# Patient Record
Sex: FEMALE | Race: WHITE | NOT HISPANIC OR LATINO | Employment: UNEMPLOYED | URBAN - METROPOLITAN AREA
[De-identification: names, ages, dates, MRNs, and addresses within clinical notes are randomized per-mention and may not be internally consistent; named-entity substitution may affect disease eponyms.]

---

## 2017-02-21 ENCOUNTER — TELEPHONE (OUTPATIENT)
Dept: OTOLARYNGOLOGY | Facility: CLINIC | Age: 32
End: 2017-02-21

## 2017-02-21 DIAGNOSIS — H81.10 BPPV (BENIGN PAROXYSMAL POSITIONAL VERTIGO), UNSPECIFIED LATERALITY: Primary | ICD-10-CM

## 2017-02-21 NOTE — TELEPHONE ENCOUNTER
----- Message from Karla Cherry PT sent at 2/20/2017 12:51 PM CST -----  Regarding: VR orders  HI! I saw her last year for BPPV and migraine related vertigo/dizziness. It helped. She has a long history of issues. She wants to be seen again. I need a new order for Vestibular rehab. Can you please put in another order for vertigo. Thanks! shelton

## 2017-02-23 ENCOUNTER — HOSPITAL ENCOUNTER (OUTPATIENT)
Dept: PHYSICAL THERAPY | Facility: CLINIC | Age: 32
Setting detail: THERAPIES SERIES
End: 2017-02-23
Attending: OTOLARYNGOLOGY
Payer: COMMERCIAL

## 2017-02-23 PROCEDURE — 95992 CANALITH REPOSITIONING PROC: CPT | Mod: GP | Performed by: PHYSICAL THERAPIST

## 2017-02-23 PROCEDURE — 97161 PT EVAL LOW COMPLEX 20 MIN: CPT | Mod: GP | Performed by: PHYSICAL THERAPIST

## 2017-02-23 PROCEDURE — 40000840 ZZHC STATISTIC PT VESTIBULAR VISIT: Performed by: PHYSICAL THERAPIST
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
## 2017-02-24 ENCOUNTER — TELEPHONE (OUTPATIENT)
Dept: PHYSICAL THERAPY | Facility: CLINIC | Age: 32
End: 2017-02-24

## 2017-02-24 NOTE — TELEPHONE ENCOUNTER
I called Sarah this afternoon to follow up on yesterday's Vestibular rehab appt. When she left yesterday she was feeling quite ill after the testing and treatment. She reports that she was nauseated most of the afternoon, until about 6. She woke up today feeling OK but any sort of motion brings on symptoms. She cannot walk fast or move fast. I asked her about sneezing, coughing and bearing down. She thinks they may make her dizziness worse. She does not recall any chewing of food making her feel worse. She spoke to her neurologist office and they increased her migraine meds and if she is not feeling better next week come in and they will do an IV medication. I told her to check in with me next week also. I can try treatment on right side (I tried one time on left side 2/23) and do some testing to see if symptoms recreated with bearing down, increased intercranial pressure. Sweetie Cherry DPT, MPT, NCS

## 2017-02-27 ENCOUNTER — HOSPITAL ENCOUNTER (OUTPATIENT)
Dept: PHYSICAL THERAPY | Facility: CLINIC | Age: 32
Setting detail: THERAPIES SERIES
End: 2017-02-27
Attending: OTOLARYNGOLOGY
Payer: COMMERCIAL

## 2017-02-27 PROCEDURE — 40000840 ZZHC STATISTIC PT VESTIBULAR VISIT: Performed by: PHYSICAL THERAPIST

## 2017-02-27 PROCEDURE — 97112 NEUROMUSCULAR REEDUCATION: CPT | Mod: GP | Performed by: PHYSICAL THERAPIST

## 2018-09-13 ENCOUNTER — HOSPITAL ENCOUNTER (OUTPATIENT)
Dept: PHYSICAL THERAPY | Facility: CLINIC | Age: 33
Setting detail: THERAPIES SERIES
End: 2018-09-13
Attending: FAMILY MEDICINE
Payer: COMMERCIAL

## 2018-09-13 PROCEDURE — 40000840 ZZHC STATISTIC PT VESTIBULAR VISIT: Performed by: PHYSICAL THERAPIST

## 2018-09-13 PROCEDURE — 95992 CANALITH REPOSITIONING PROC: CPT | Mod: GP | Performed by: PHYSICAL THERAPIST

## 2018-09-13 PROCEDURE — 97162 PT EVAL MOD COMPLEX 30 MIN: CPT | Mod: GP | Performed by: PHYSICAL THERAPIST
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
## 2018-09-20 ENCOUNTER — HOSPITAL ENCOUNTER (OUTPATIENT)
Dept: PHYSICAL THERAPY | Facility: CLINIC | Age: 33
Setting detail: THERAPIES SERIES
End: 2018-09-20
Attending: FAMILY MEDICINE
Payer: COMMERCIAL

## 2018-09-20 PROCEDURE — 95992 CANALITH REPOSITIONING PROC: CPT | Mod: GP | Performed by: PHYSICAL THERAPIST

## 2018-09-20 PROCEDURE — 40000840 ZZHC STATISTIC PT VESTIBULAR VISIT: Performed by: PHYSICAL THERAPIST

## 2018-09-20 PROCEDURE — 97112 NEUROMUSCULAR REEDUCATION: CPT | Mod: GP | Performed by: PHYSICAL THERAPIST

## 2018-10-02 ENCOUNTER — TELEPHONE (OUTPATIENT)
Dept: PHYSICAL THERAPY | Facility: CLINIC | Age: 33
End: 2018-10-02

## 2018-10-02 NOTE — TELEPHONE ENCOUNTER
I called Sarah to follow up on last appt and her vertigo. She had to reschedule her appt with DR Vallejo to 10/16. She will also have a hearing test prior to seeing him. I gave her the name and number to neurology clinic at [facility] to possible see DR Stevens about her migraine headaches. She did check with her insurance and she is covered to go to [place] but has not called to check into an appt there.     She felt pretty miserable/sick after my last appt for a couple of days. She is feeling a little better now and has not had to take the lorazepam for about 1.5 weeks now to help her sleep.     I will wait until after she sees Dr Vallejo to see what the next step is.     Sweetie Cherry DPT, MPT, NCS   Physical Therapist   Board Certified Neurologic Clinical Specialist   [facility]   [address] [place] [address]   ENT/Audiology Clinic-4th [place] [address]   [email or web address]  [email or web address]   [phone]   Clinic: /   Fax: [phone] or

## 2018-10-16 ENCOUNTER — OFFICE VISIT (OUTPATIENT)
Dept: AUDIOLOGY | Facility: CLINIC | Age: 33
End: 2018-10-16
Payer: COMMERCIAL

## 2018-10-16 ENCOUNTER — OFFICE VISIT (OUTPATIENT)
Dept: OTOLARYNGOLOGY | Facility: CLINIC | Age: 33
End: 2018-10-16
Payer: COMMERCIAL

## 2018-10-16 VITALS
HEART RATE: 87 BPM | DIASTOLIC BLOOD PRESSURE: 89 MMHG | HEIGHT: 62 IN | SYSTOLIC BLOOD PRESSURE: 140 MMHG | BODY MASS INDEX: 23.92 KG/M2 | WEIGHT: 130 LBS

## 2018-10-16 DIAGNOSIS — R42 DIZZINESS: Primary | ICD-10-CM

## 2018-10-16 DIAGNOSIS — H81.319 VERTIGO, AURAL, UNSPECIFIED LATERALITY: Primary | ICD-10-CM

## 2018-10-16 DIAGNOSIS — G43.109 MIGRAINE WITH VERTIGO: ICD-10-CM

## 2018-10-16 PROCEDURE — 92550 TYMPANOMETRY & REFLEX THRESH: CPT | Performed by: AUDIOLOGIST

## 2018-10-16 PROCEDURE — 92557 COMPREHENSIVE HEARING TEST: CPT | Performed by: AUDIOLOGIST

## 2018-10-16 PROCEDURE — 99213 OFFICE O/P EST LOW 20 MIN: CPT | Performed by: OTOLARYNGOLOGY

## 2018-10-16 RX ORDER — TURMERIC ROOT EXTRACT 500 MG
TABLET ORAL
COMMUNITY

## 2018-10-16 NOTE — NURSING NOTE
"Sarah Carrillo's goals for this visit include:   Chief Complaint   Patient presents with     RECHECK     Vertigo       She requests these members of her care team be copied on today's visit information: yes      PCP: Elvira Tee    Referring Provider:  No referring provider defined for this encounter.    /89  Pulse 87  Ht 1.575 m (5' 2\")  Wt 59 kg (130 lb)  BMI 23.78 kg/m2    Maye Vazquez CMA ([facility])    "
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
## 2018-10-16 NOTE — PROGRESS NOTES
AUDIOLOGY REPORT    SUMMARY: Audiology visit completed. See audiogram for results.    RECOMMENDATIONS: Follow-up with ENT.    Daniel Major  Doctor of Audiology  [place] License # [id number]

## 2018-10-16 NOTE — MR AVS SNAPSHOT
After Visit Summary   10/16/2018    Sarah Carrillo    MRN: [id number]           Patient Information     Date Of Birth          1985        Visit Information        Provider Department      10/16/2018 8:30 AM Avis Vallejo MD [facility]        Today's Diagnoses     Vertigo, aural, unspecified laterality    -  1    Migraine with vertigo           Follow-ups after your visit        Additional Services     AUDIOLOGY BALANCE REFERRAL       Your provider has referred you to: [facility] Balance [facility] [phone] [email or web address]    Audiology Balance Referral (Comprehensive) includes Videonystagmography (VNG), Rotational Chair testing, and Computerized Dynamic Posturography.  You may also select individual tests from the list below.    Procedure(s): Audiology Balance Referral Comprehensive (includes Videonystagmography (VNG), Rotational Chair Testing and Computerized Dynamic Posturography)                  Your next 10 appointments already scheduled     Oct 24, 2018  1:15 PM CDT   MR BRAIN W/O & W CONTRAST with MGMR1   [facility] ([facility]    [id number] [place] [id number] [phone]           How do I prepare for my exam? (Food and drink instructions) **If you will be receiving sedation or general anesthesia, please see special notes below.**  How do I prepare for my exam? (Other instructions) Take your medicines as usual, unless your doctor tells you not to. You may or may not receive intravenous (IV) contrast for this exam pending the discretion of the Radiologist.  You do not need to do anything special to prepare.  **If you will be receiving sedation or general anesthesia, please see special notes below.**  What should I wear: The MRI machine uses a strong magnet. Please wear clothes without metal (snaps, zippers). A sweatsuit works well, or we may give you a  [facility] gown. Please remove any body piercings and hair extensions before you arrive. You will also remove watches, jewelry, hairpins, wallets, dentures, partial dental plates and hearing aids. You may wear contact lenses, and you may be able to wear your rings. We have a safe place to keep your personal items, but it is safer to leave them at home.  How long does the exam take: Most tests take 30 to 60 minutes.  HOWEVER, IF YOUR DOCTOR PRESCRIBES ANESTHESIA please plan on spending four to five hours in the recovery room.  What should I bring:  Bring a list of your current medicines to your exam (including vitamins, minerals and over-the-counter drugs).  Do I need a :  **If you will be receiving sedation or general anesthesia, please see special notes below.**  What should I do after the exam: No Restrictions, You may resume normal activities.  What is this test: MRI (magnetic resonance imaging) uses a strong magnet and radio waves to look inside the body. An MRA (magnetic resonance angiogram) does the same thing, but it lets us look at your blood vessels. A computer turns the radio waves into pictures showing cross sections of the body, much like slices of bread. This helps us see any problems more clearly. You may receive fluid (called  contrast ) before or during your scan. The fluid helps us see the pictures better. We give the fluid through an IV (small needle in your arm).  Who should I call with questions:  Please call the Imaging Department at your exam site with any questions. Directions, parking instructions, and other information is available on our website, [email or web address].  How do I prepare if I m having sedation or anesthesia? **IMPORTANT** THE INSTRUCTIONS BELOW ARE ONLY FOR THOSE PATIENTS WHO HAVE BEEN TOLD THEY WILL RECEIVE SEDATION OR GENERAL ANESTHESIA DURING THEIR MRI PROCEDURE:  IF YOU WILL RECEIVE SEDATION (take medicine to help you relax during your exam): You must get the medicine  from your doctor before you arrive. Bring the medicine to the exam. Do not take it at home. Arrive one hour early. Bring someone who can take you home after the test. Your medicine will make you sleepy. After the exam, you may not drive, take a bus or take a taxi by yourself. No eating 8 hours before your exam. You may have clear liquids up until 4 hours before your exam. (Clear liquids include water, clear tea, black coffee and fruit juice without pulp.)  IF YOU WILL RECEIVE ANESTHESIA (be asleep for your exam): Arrive 1 1/2 hours early. Bring someone who can take you home after the test. You may not drive, take a bus or take a taxi by yourself. No eating 8 hours before your exam. You may have clear liquids up until 4 hours before your exam. (Clear liquids include water, clear tea, black coffee and fruit juice without pulp.)              Future tests that were ordered for you today     Open Future Orders        Priority Expected Expires Ordered    MR Brain w/o & w Contrast Routine  10/16/2019 10/16/2018            Who to contact     If you have questions or need follow up information about today's clinic visit or your schedule please contact [facility] directly at [phone].  Normal or non-critical lab and imaging results will be communicated to you by [employer], letter or phone within 4 business days after the clinic has received the results. If you do not hear from us within 7 days, please contact the clinic through [employer] or phone. If you have a critical or abnormal lab result, we will notify you by phone as soon as possible.  Submit refill requests through [employer] or call your pharmacy and they will forward the refill request to us. Please allow 3 business days for your refill to be completed.          Additional Information About Your Visit        Care EveryWhere ID     This is your Care EveryWhere ID. This could be used by other organizations to access your [facility]  "records  [id number]        Your Vitals Were     Pulse Height BMI (Body Mass Index)             87 1.575 m (5' 2\") 23.78 kg/m2          Blood Pressure from Last 3 Encounters:   10/16/18 140/89   11/23/15 117/56    Weight from Last 3 Encounters:   10/16/18 59 kg (130 lb)   11/23/15 59 kg (130 lb)              We Performed the Following     AUDIOLOGY BALANCE REFERRAL        Primary Care Provider Office Phone # Fax #    Elvira Tee [phone]       [facility] [address] [address]        Equal Access to Services     [facility] MIRTHA : Hadii aad ku hadasho Soomaali, waaxda luqadaha, qaybta kaalmada adeegyada, waxay davisin haytom smiley . So [facility] [phone].    ATENCIÓN: Si habla español, tiene a thompson disposición servicios gratuitos de asistencia lingüística. [facility] [phone].    We comply with applicable federal civil rights laws and [place] laws. We do not discriminate on the basis of race, color, national origin, age, disability, sex, sexual orientation, or gender identity.            Thank you!     Thank you for choosing [facility]  for your care. Our goal is always to provide you with excellent care. Hearing back from our patients is one way we can continue to improve our services. Please take a few minutes to complete the written survey that you may receive in the mail after your visit with us. Thank you!             Your Updated Medication List - Protect others around you: Learn how to safely use, store and throw away your medicines at www.disposemymeds.org.          This list is accurate as of 10/16/18  9:03 AM.  Always use your most recent med list.                   Brand Name Dispense Instructions for use Diagnosis    FLOVENT  MCG/ACT Inhaler   Generic drug:  fluticasone      Inhale 2 puffs into the lungs 2 times daily    BPPV (benign paroxysmal positional vertigo), left, Vertigo       fluticasone 50 MCG/ACT spray    " FLONASE     Spray 2 sprays into both nostrils daily    BPPV (benign paroxysmal positional vertigo), left, Vertigo       LORAZEPAM PO           Turmeric 500 MG Tabs           ZOFRAN PO

## 2018-10-16 NOTE — MR AVS SNAPSHOT
After Visit Summary   10/16/2018    Sarah Carrillo    MRN: [id number]           Patient Information     Date Of Birth          1985        Visit Information        Provider Department      10/16/2018 8:00 AM Harry Hardin AuD [facility]        Today's Diagnoses     Dizziness    -  1       Follow-ups after your visit        Who to contact     If you have questions or need follow up information about today's clinic visit or your schedule please contact [facility] directly at [phone].  Normal or non-critical lab and imaging results will be communicated to you by MyChart, letter or phone within 4 business days after the clinic has received the results. If you do not hear from us within 7 days, please contact the clinic through MyChart or phone. If you have a critical or abnormal lab result, we will notify you by phone as soon as possible.  Submit refill requests through [employer] or call your pharmacy and they will forward the refill request to us. Please allow 3 business days for your refill to be completed.          Additional Information About Your Visit        Care EveryWhere ID     This is your Care EveryWhere ID. This could be used by other organizations to access your [place] medical records  [id number]         Blood Pressure from Last 3 Encounters:   10/16/18 140/89   11/23/15 117/56    Weight from Last 3 Encounters:   10/16/18 130 lb (59 kg)   11/23/15 130 lb (59 kg)              We Performed the Following     AUDIOGRAM/TYMPANOGRAM - INTERFACE     COMPREHENSIVE HEARING TEST     TYMPANOMETRY AND REFLEX THRESHOLD MEASUREMENTS        Primary Care Provider Office Phone # Fax #    Elvira Tee [phone]       [address] [address]        Equal Access to Services     KEZIA SHANNON : Marleni forde [facility], waaxda luqmiguel, qaybisaac salamanca  lagauri morales. So [facility] [phone].    ATENCIÓN: Si habla junie, tiene a thompson disposición servicios gratuitos de asistencia lingüística. Nikita al [phone].    We comply with applicable federal civil rights laws and [place] laws. We do not discriminate on the basis of race, color, national origin, age, disability, sex, sexual orientation, or gender identity.            Thank you!     Thank you for choosing [facility]  for your care. Our goal is always to provide you with excellent care. Hearing back from our patients is one way we can continue to improve our services. Please take a few minutes to complete the written survey that you may receive in the mail after your visit with us. Thank you!             Your Updated Medication List - Protect others around you: Learn how to safely use, store and throw away your medicines at www.disposemymeds.org.          This list is accurate as of 10/16/18  8:35 AM.  Always use your most recent med list.                   Brand Name Dispense Instructions for use Diagnosis    FLOVENT  MCG/ACT Inhaler   Generic drug:  fluticasone      Inhale 2 puffs into the lungs 2 times daily    BPPV (benign paroxysmal positional vertigo), left, Vertigo       fluticasone 50 MCG/ACT spray    FLONASE     Spray 2 sprays into both nostrils daily    BPPV (benign paroxysmal positional vertigo), left, Vertigo       LORAZEPAM PO           Turmeric 500 MG Tabs           ZOFRAN PO

## 2018-10-24 ENCOUNTER — TELEPHONE (OUTPATIENT)
Dept: OTOLARYNGOLOGY | Facility: CLINIC | Age: 33
End: 2018-10-24

## 2018-10-24 DIAGNOSIS — Z91.041 CONTRAST MEDIA ALLERGY: Primary | ICD-10-CM

## 2018-10-26 RX ORDER — METHYLPREDNISOLONE 32 MG/1
TABLET ORAL
Qty: 2 TABLET | Refills: 0 | Status: SHIPPED | OUTPATIENT
Start: 2018-10-26

## 2018-10-26 NOTE — TELEPHONE ENCOUNTER
----- Message from Jak Hardin sent at 10/24/2018  1:33 PM CDT -----  Regarding: MRI Pre Med contrast allergy  Contact: [phone]  Dr Vallejo,    Today we had MINNIECT [[id number]] for an MRI, she is allergic to MRI contrast and needs to be pre-medicated.     She needs 32 mg of methylprednesolone 12 hrs prior and another 32 mg methylprednisolone 2 prior to her MRI exam.    Please contact patient and confirm which pharmacy she uses so she can re-schedule her MRI asap.    Thank You

## 2018-10-30 NOTE — TELEPHONE ENCOUNTER
[facility] for her pharmacy, please call patient when this has been called in.     Patient was allergic to Prohance (Gadoteridol) Dye -

## 2018-10-30 NOTE — TELEPHONE ENCOUNTER
Phone call to pt, medication order sent to [facility].  Prohance added to allergy list.  Fern Mancilla RN

## 2018-11-02 ENCOUNTER — RADIANT APPOINTMENT (OUTPATIENT)
Dept: MRI IMAGING | Facility: CLINIC | Age: 33
End: 2018-11-02
Attending: OTOLARYNGOLOGY
Payer: COMMERCIAL

## 2018-11-02 DIAGNOSIS — R42 VERTIGO: Primary | ICD-10-CM

## 2018-11-02 PROCEDURE — 70553 MRI BRAIN STEM W/O & W/DYE: CPT | Performed by: RADIOLOGY

## 2018-11-02 PROCEDURE — A9585 GADOBUTROL INJECTION: HCPCS | Mod: JW | Performed by: OTOLARYNGOLOGY

## 2018-11-02 RX ORDER — GADOBUTROL 604.72 MG/ML
7.5 INJECTION INTRAVENOUS ONCE
Status: COMPLETED | OUTPATIENT
Start: 2018-11-02 | End: 2018-11-02

## 2018-11-02 RX ADMIN — GADOBUTROL 6 ML: 604.72 INJECTION INTRAVENOUS at 13:34

## 2018-11-07 ENCOUNTER — TELEPHONE (OUTPATIENT)
Dept: OTOLARYNGOLOGY | Facility: CLINIC | Age: 33
End: 2018-11-07

## 2018-11-07 NOTE — TELEPHONE ENCOUNTER
Phone call to pt, discussed MRI head results were normal. Reviewed Dr Vallejo's recommendation to continue with vestibular testing as already scheduled. Pt verbalized understanding, will review vestibular testing results when available and contact pt.  Fern Mancilla RN

## 2023-08-15 ENCOUNTER — HOSPITAL ENCOUNTER (EMERGENCY)
Facility: CLINIC | Age: 38
Discharge: HOME | End: 2023-08-15
Attending: EMERGENCY MEDICINE | Admitting: EMERGENCY MEDICINE
Payer: COMMERCIAL

## 2023-08-15 VITALS
WEIGHT: 118.9 LBS | DIASTOLIC BLOOD PRESSURE: 95 MMHG | TEMPERATURE: 98.4 F | RESPIRATION RATE: 16 BRPM | SYSTOLIC BLOOD PRESSURE: 143 MMHG | OXYGEN SATURATION: 99 % | HEART RATE: 86 BPM | BODY MASS INDEX: 21.75 KG/M2

## 2023-08-15 DIAGNOSIS — T23.262A PARTIAL THICKNESS BURN OF BACK OF LEFT HAND, INITIAL ENCOUNTER: ICD-10-CM

## 2023-08-15 PROCEDURE — 250N000011 HC RX IP 250 OP 636: Performed by: EMERGENCY MEDICINE

## 2023-08-15 PROCEDURE — 99283 EMERGENCY DEPT VISIT LOW MDM: CPT | Performed by: EMERGENCY MEDICINE

## 2023-08-15 PROCEDURE — 90715 TDAP VACCINE 7 YRS/> IM: CPT | Performed by: EMERGENCY MEDICINE

## 2023-08-15 PROCEDURE — 99283 EMERGENCY DEPT VISIT LOW MDM: CPT | Mod: 25 | Performed by: EMERGENCY MEDICINE

## 2023-08-15 PROCEDURE — 90471 IMMUNIZATION ADMIN: CPT | Performed by: EMERGENCY MEDICINE

## 2023-08-15 RX ORDER — BACITRACIN ZINC 500 [USP'U]/G
OINTMENT TOPICAL 2 TIMES DAILY
Qty: 113 G | Refills: 0 | Status: SHIPPED | OUTPATIENT
Start: 2023-08-15

## 2023-08-15 RX ADMIN — CLOSTRIDIUM TETANI TOXOID ANTIGEN (FORMALDEHYDE INACTIVATED), CORYNEBACTERIUM DIPHTHERIAE TOXOID ANTIGEN (FORMALDEHYDE INACTIVATED), BORDETELLA PERTUSSIS TOXOID ANTIGEN (GLUTARALDEHYDE INACTIVATED), BORDETELLA PERTUSSIS FILAMENTOUS HEMAGGLUTININ ANTIGEN (FORMALDEHYDE INACTIVATED), BORDETELLA PERTUSSIS PERTACTIN ANTIGEN, AND BORDETELLA PERTUSSIS FIMBRIAE 2/3 ANTIGEN 0.5 ML: 5; 2; 2.5; 5; 3; 5 INJECTION, SUSPENSION INTRAMUSCULAR at 13:50

## 2023-08-15 NOTE — DISCHARGE INSTRUCTIONS
Been doing very well taking care of this burn.  May continue to wash gently with lukewarm water and mild soap.  Pat dry carefully.    May apply the antibiotic ointment twice daily and keep covered with a bandage to help with healing and to prevent infection    Your tetanus vaccine was updated today    Follow-up with your primary doctor in clinic as needed.  If you develop any new or worsening symptoms, such as streaking redness up your arm, pus draining from the blisters or any wounds, or if you develop a fever, you should seen promptly in the ER for evaluation
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
